# Patient Record
Sex: FEMALE | Race: WHITE | NOT HISPANIC OR LATINO | URBAN - METROPOLITAN AREA
[De-identification: names, ages, dates, MRNs, and addresses within clinical notes are randomized per-mention and may not be internally consistent; named-entity substitution may affect disease eponyms.]

---

## 2023-08-28 ENCOUNTER — APPOINTMENT (RX ONLY)
Dept: URBAN - METROPOLITAN AREA CLINIC 129 | Facility: CLINIC | Age: 33
Setting detail: DERMATOLOGY
End: 2023-08-28

## 2023-08-28 DIAGNOSIS — L30.9 DERMATITIS, UNSPECIFIED: ICD-10-CM

## 2023-08-28 DIAGNOSIS — L73.8 OTHER SPECIFIED FOLLICULAR DISORDERS: ICD-10-CM

## 2023-08-28 DIAGNOSIS — B36.0 PITYRIASIS VERSICOLOR: ICD-10-CM

## 2023-08-28 PROCEDURE — 99204 OFFICE O/P NEW MOD 45 MIN: CPT

## 2023-08-28 PROCEDURE — ? COUNSELING

## 2023-08-28 PROCEDURE — ? PRESCRIPTION

## 2023-08-28 PROCEDURE — ? TREATMENT REGIMEN

## 2023-08-28 RX ORDER — KETOCONAZOLE 20 MG/ML
SHAMPOO, SUSPENSION TOPICAL
Qty: 120 | Refills: 11 | Status: ERX | COMMUNITY
Start: 2023-08-28

## 2023-08-28 RX ORDER — TACROLIMUS 1 MG/G
OINTMENT TOPICAL
Qty: 60 | Refills: 2 | Status: ERX | COMMUNITY
Start: 2023-08-28

## 2023-08-28 RX ORDER — CLOBETASOL PROPIONATE 0.5 MG/G
OINTMENT TOPICAL
Qty: 30 | Refills: 1 | Status: ERX | COMMUNITY
Start: 2023-08-28

## 2023-08-28 RX ADMIN — TACROLIMUS: 1 OINTMENT TOPICAL at 00:00

## 2023-08-28 RX ADMIN — KETOCONAZOLE: 20 SHAMPOO, SUSPENSION TOPICAL at 00:00

## 2023-08-28 RX ADMIN — CLOBETASOL PROPIONATE: 0.5 OINTMENT TOPICAL at 00:00

## 2023-08-28 ASSESSMENT — LOCATION DETAILED DESCRIPTION DERM
LOCATION DETAILED: GENITALIA
LOCATION DETAILED: RIGHT LABIA MINORA
LOCATION DETAILED: LEFT LABIA MAJORA
LOCATION DETAILED: RIGHT LABIA MAJORA
LOCATION DETAILED: RIGHT MEDIAL BREAST 2-3:00 REGION
LOCATION DETAILED: LEFT LABIA MINORA
LOCATION DETAILED: LEFT MEDIAL BREAST 10-11:00 REGION

## 2023-08-28 ASSESSMENT — LOCATION SIMPLE DESCRIPTION DERM
LOCATION SIMPLE: LEFT BREAST
LOCATION SIMPLE: VULVA
LOCATION SIMPLE: RIGHT BREAST
LOCATION SIMPLE: GENITALIA

## 2023-08-28 ASSESSMENT — LOCATION ZONE DERM
LOCATION ZONE: TRUNK
LOCATION ZONE: VULVA

## 2023-08-28 NOTE — PROCEDURE: TREATMENT REGIMEN
Detail Level: Zone
Initiate Treatment: Clobetadol ointment BID x1 week, tacrolimus BID
Otc Regimen: Aquaphor daily

## 2023-09-26 ENCOUNTER — APPOINTMENT (RX ONLY)
Dept: URBAN - METROPOLITAN AREA CLINIC 129 | Facility: CLINIC | Age: 33
Setting detail: DERMATOLOGY
End: 2023-09-26

## 2023-09-26 DIAGNOSIS — Q826 OTHER SPECIFIED ANOMALIES OF SKIN: ICD-10-CM

## 2023-09-26 DIAGNOSIS — Q828 OTHER SPECIFIED ANOMALIES OF SKIN: ICD-10-CM

## 2023-09-26 DIAGNOSIS — L30.9 DERMATITIS, UNSPECIFIED: ICD-10-CM

## 2023-09-26 DIAGNOSIS — L259 CONTACT DERMATITIS AND OTHER ECZEMA, UNSPECIFIED CAUSE: ICD-10-CM | Status: INADEQUATELY CONTROLLED

## 2023-09-26 DIAGNOSIS — Q819 OTHER SPECIFIED ANOMALIES OF SKIN: ICD-10-CM

## 2023-09-26 DIAGNOSIS — L73.8 OTHER SPECIFIED FOLLICULAR DISORDERS: ICD-10-CM

## 2023-09-26 PROBLEM — L23.9 ALLERGIC CONTACT DERMATITIS, UNSPECIFIED CAUSE: Status: ACTIVE | Noted: 2023-09-26

## 2023-09-26 PROBLEM — L85.8 OTHER SPECIFIED EPIDERMAL THICKENING: Status: ACTIVE | Noted: 2023-09-26

## 2023-09-26 PROCEDURE — ? COUNSELING

## 2023-09-26 PROCEDURE — ? TREATMENT REGIMEN

## 2023-09-26 PROCEDURE — 99214 OFFICE O/P EST MOD 30 MIN: CPT

## 2023-09-26 PROCEDURE — ? ADDITIONAL NOTES

## 2023-09-26 ASSESSMENT — LOCATION SIMPLE DESCRIPTION DERM
LOCATION SIMPLE: RIGHT POSTERIOR UPPER ARM
LOCATION SIMPLE: VULVA
LOCATION SIMPLE: GENITALIA
LOCATION SIMPLE: LEFT POSTERIOR UPPER ARM

## 2023-09-26 ASSESSMENT — LOCATION ZONE DERM
LOCATION ZONE: TRUNK
LOCATION ZONE: VULVA
LOCATION ZONE: ARM

## 2023-09-26 ASSESSMENT — LOCATION DETAILED DESCRIPTION DERM
LOCATION DETAILED: RIGHT LABIA MAJORA
LOCATION DETAILED: LEFT PROXIMAL POSTERIOR UPPER ARM
LOCATION DETAILED: LEFT LABIA MAJORA
LOCATION DETAILED: RIGHT PROXIMAL POSTERIOR UPPER ARM
LOCATION DETAILED: RIGHT LABIA MINORA
LOCATION DETAILED: LEFT LABIA MINORA
LOCATION DETAILED: GENITALIA

## 2023-09-26 ASSESSMENT — ITCH NUMERIC RATING SCALE: HOW SEVERE IS YOUR ITCHING?: 5

## 2023-09-26 NOTE — PROCEDURE: TREATMENT REGIMEN
Detail Level: Zone
Otc Regimen: Aquaphor daily
Continue Regimen: tacrolimus 0.1 % topical ointment as needed

## 2023-11-06 ENCOUNTER — APPOINTMENT (RX ONLY)
Dept: URBAN - METROPOLITAN AREA CLINIC 129 | Facility: CLINIC | Age: 33
Setting detail: DERMATOLOGY
End: 2023-11-06

## 2023-11-06 DIAGNOSIS — L23.9 ALLERGIC CONTACT DERMATITIS, UNSPECIFIED CAUSE: ICD-10-CM

## 2023-11-06 PROCEDURE — ? PATCH TESTING

## 2023-11-06 PROCEDURE — 95044 PATCH/APPLICATION TESTS: CPT

## 2023-11-06 PROCEDURE — ? ADDITIONAL NOTES

## 2023-11-06 ASSESSMENT — LOCATION ZONE DERM: LOCATION ZONE: TRUNK

## 2023-11-06 ASSESSMENT — LOCATION DETAILED DESCRIPTION DERM: LOCATION DETAILED: INFERIOR THORACIC SPINE

## 2023-11-06 ASSESSMENT — LOCATION SIMPLE DESCRIPTION DERM: LOCATION SIMPLE: UPPER BACK

## 2023-11-08 ENCOUNTER — APPOINTMENT (RX ONLY)
Dept: URBAN - METROPOLITAN AREA CLINIC 129 | Facility: CLINIC | Age: 33
Setting detail: DERMATOLOGY
End: 2023-11-08

## 2023-11-08 DIAGNOSIS — L23.9 ALLERGIC CONTACT DERMATITIS, UNSPECIFIED CAUSE: ICD-10-CM | Status: INADEQUATELY CONTROLLED

## 2023-11-08 PROCEDURE — ? COUNSELING

## 2023-11-08 PROCEDURE — ? CORE ACDS PATCH TEST READING

## 2023-11-08 PROCEDURE — 99212 OFFICE O/P EST SF 10 MIN: CPT

## 2023-11-08 NOTE — PROCEDURE: CORE ACDS PATCH TEST READING
Name Of Allergen 52: Chlorhexidine digluconate 0.5% aq
Allergen 11 Reaction: no reaction
Name Of Allergen 29: Imidazolidinyl urea 2% pet
Name Of Allergen 2: Lanolin alcohol (Amerchol 101) 50% pet
Name Of Allergen 62: Sodium benzoate 5% pet
Name Of Allergen 39: Polymyxin B sulfate 3% pet
What Reading Time Point?: 48 hour
Name Of Allergen 7: Colophony 20% pet
Name Of Allergen 57: Sesquiterpene lactone mix 0.1% pet
Name Of Allergen 67: Sorbitan sesquioleate 20% pet
Name Of Allergen 17: Tetracaine hydrochloride 5% pet
Name Of Allergen 78: 2,6-Ditert-butyl-4-cresol (BHT) 2% pet
Name Of Allergen 12: Cobalt chloride 1% pet
Name Of Allergen 73: Amidoamine 0.1% aq
Detail Level: Zone
Name Of Allergen 54: Chloroxylenol (PCMX) 1% pet
Name Of Allergen 27: Tixocortol-21-pivalate
Name Of Allergen 44: Oleamidopropyl dimethylamine 0.1% aq
Name Of Allergen 4: Potassium dichromate 0.25% pet
Name Of Allergen 22: Mercapto mix 1% pet
Number Of Patches Read: 80
Name Of Allergen 49: D/L-?-Tocopherol 100%
Name Of Allergen 36: Lidocaine 15% pet
Name Of Allergen 64: Talia 2% pet
Name Of Allergen 31: Hydrocortisone-17-butyrate 1% pet
Name Of Allergen 41: Mixed dialkyl thioureas 1% pet
Name Of Allergen 79: 2-Ethylhexyl-4-methoxycinnamate 10% pet
Show Negative Results In The Note?: Yes
Name Of Allergen 50: Ethyl acrylate 0.1% pet
Name Of Allergen 45: Decyl glucoside 5% pet
Name Of Allergen 5: DMDM hydantoin 1% pet
Name Of Allergen 65: Compositae mix II 5% pet
Name Of Allergen 32: Mercaptobenzothiazole 1% pet
Show Allergen Counseling In The Note?: No
Name Of Allergen 37: Propylene glycol 100% aq
Name Of Allergen 55: 2-Hydroxy-4-methoxybenzophenone (benzophenone-3) 10% pet
Name Of Allergen 15: Carba mix 3% pet
Name Of Allergen 10: Balsam of [place] 25% pet
Name Of Allergen 60: Benzalkonium chloride 0.1% aq
Name Of Allergen 70: Ethylhexylglycerin 5% pet
Name Of Allergen 71: Triamcinolone 1% pet
Name Of Allergen 47: Lavender absolute 2% pet
Name Of Allergen 76: Disperse Orange 3 1% pet
Name Of Allergen 20: p-Phenylenediamine 1% pet
Name Of Allergen 34: Fragrance mix II 14% pet
Name Of Allergen 42: 3-(Dimethylamino)-propylamine 1% aq
Name Of Allergen 25: Diazolidinyl urea 1% pet
Name Of Allergen 16: Black rubber mix 0.6% pet
Name Of Allergen 43: Hydroxyethyl methacrylate 2% pet
Name Of Allergen 26: Benzocaine 5% pet
Name Of Allergen 30: Budesonide 0.1% pet
Name Of Allergen 77: Benzoic acid 5% pet
Name Of Allergen 48: Cinnamic aldehyde 1% pet
Name Of Allergen 35: Disperse blue 106/124 mix 1% pet
Name Of Allergen 53: Propolis 10% pet
Name Of Allergen 63: Sorbic acid 2% pet
Name Of Allergen 13: i-llxd-Jxeecmeqskq formaldehyde resin 1% pet
Name Of Allergen 3: Neomycin 20% pet
Name Of Allergen 8: Paraben mix 12% pet
Name Of Allergen 58: Cocamide KATHRYN 0.5% pet
Name Of Allergen 18: Quaternium 15 2% pet
Name Of Allergen 40: Cocamidopropyl betaine 1% aq
Name Of Allergen 23: 2-Bromo-2-nitropropane-1,3-diol 0.5% pet
Name Of Allergen 74: Ethyl cyanoacrylate 10% pet
Name Of Allergen 68: n,n-Diphenylguanidine 1% pet
Name Of Allergen 69: Cetyl steryl alcohol 20% pet
Name Of Allergen 59: 4-Chloro-3-cresol ([facility]) 1% pet
Name Of Allergen 24: Thiuram mix 1% pet
Name Of Allergen 14: Epoxy resin 1% pet
Name Of Allergen 9: Methylisothiazolinone 0.2% aq
Name Of Allergen 19: Methyldibromoglutaronitrile 0.5% pet
Name Of Allergen 75: Phenoxyethanol 1% pet
Name Of Allergen 46: Methyl methacrylate 2% pet
Name Of Allergen 80: Benzyl alcohol 10% soft
Name Of Allergen 6: Fragrance mix I 8% pet
Name Of Allergen 33: Bacitracin 20% pet
Name Of Allergen 51: Tea tree oil 5% pet
Name Of Allergen 1: Nickel sulfate 2.5% pet
Name Of Allergen 28: Gold sodium thiosulfate 2% pet
Name Of Allergen 61: 2-Hydroxy-4-methoxybenzophenone-5-sulfonic acid (benzophenone-4) 2% pet
Name Of Allergen 38: Iodopropynyl butylcarbamate 0.1% pet
Name Of Allergen 72: Clobetasol-17-propionate 1% pet
Name Of Allergen 66: Ethyleneurea melamine-formaldehyde 5% pet
Name Of Allergen 56: Tosylamide formaldehyde resin 10% pet
Name Of Allergen 11: Ethylenediamine dihydrochloride 1% pet
Name Of Allergen 21: Formaldehyde 2% aq

## 2023-11-10 ENCOUNTER — APPOINTMENT (RX ONLY)
Dept: URBAN - METROPOLITAN AREA CLINIC 129 | Facility: CLINIC | Age: 33
Setting detail: DERMATOLOGY
End: 2023-11-10

## 2023-11-10 DIAGNOSIS — L23.9 ALLERGIC CONTACT DERMATITIS, UNSPECIFIED CAUSE: ICD-10-CM

## 2023-11-10 PROCEDURE — 99214 OFFICE O/P EST MOD 30 MIN: CPT

## 2023-11-10 PROCEDURE — ? CORE ACDS PATCH TEST READING

## 2023-11-10 PROCEDURE — ? COUNSELING

## 2023-11-10 NOTE — PROCEDURE: CORE ACDS PATCH TEST READING
Allergen 49 Reaction: no reaction
Name Of Allergen 36: Lidocaine 15% pet
Name Of Allergen 12: Cobalt chloride 1% pet
Name Of Allergen 25: Diazolidinyl urea 1% pet
Name Of Allergen 64: Talia 2% pet
Name Of Allergen 41: Mixed dialkyl thioureas 1% pet
Name Of Allergen 4: Potassium dichromate 0.25% pet
Name Of Allergen 77: Benzoic acid 5% pet
Detail Level: Zone
Name Of Allergen 74: Ethyl cyanoacrylate 10% pet
Name Of Allergen 55: 2-Hydroxy-4-methoxybenzophenone (benzophenone-3) 10% pet
Name Of Allergen 69: Cetyl steryl alcohol 20% pet
Name Of Allergen 46: Methyl methacrylate 2% pet
Name Of Allergen 65: Compositae mix II 5% pet
Name Of Allergen 51: Tea tree oil 5% pet
Number Of Patches Read: 80
Name Of Allergen 17: Tetracaine hydrochloride 5% pet
Name Of Allergen 22: Mercapto mix 1% pet
Name Of Allergen 9: Methylisothiazolinone 0.2% aq
Name Of Allergen 61: 2-Hydroxy-4-methoxybenzophenone-5-sulfonic acid (benzophenone-4) 2% pet
Name Of Allergen 38: Iodopropynyl butylcarbamate 0.1% pet
Name Of Allergen 14: Epoxy resin 1% pet
Name Of Allergen 27: Tixocortol-21-pivalate
Name Of Allergen 70: Ethylhexylglycerin 5% pet
Name Of Allergen 52: Chlorhexidine digluconate 0.5% aq
Name Of Allergen 66: Ethyleneurea melamine-formaldehyde 5% pet
Name Of Allergen 10: Balsam of [place] 25% pet
Name Of Allergen 71: Triamcinolone 1% pet
Name Of Allergen 72: Clobetasol-17-propionate 1% pet
Name Of Allergen 28: Gold sodium thiosulfate 2% pet
Name Of Allergen 33: Bacitracin 20% pet
Name Of Allergen 62: Sodium benzoate 5% pet
Allergen 1 Reaction: 1+
Name Of Allergen 15: Carba mix 3% pet
Name Of Allergen 53: Propolis 10% pet
Name Of Allergen 75: Phenoxyethanol 1% pet
Name Of Allergen 44: Oleamidopropyl dimethylamine 0.1% aq
Name Of Allergen 80: Benzyl alcohol 10% soft
Name Of Allergen 39: Polymyxin B sulfate 3% pet
Name Of Allergen 58: Cocamide KATHRYN 0.5% pet
Name Of Allergen 20: p-Phenylenediamine 1% pet
Name Of Allergen 67: Sorbitan sesquioleate 20% pet
Name Of Allergen 49: D/L-?-Tocopherol 100%
Name Of Allergen 2: Lanolin alcohol (Amerchol 101) 50% pet
What Reading Time Point?: 48 hour
Name Of Allergen 30: Budesonide 0.1% pet
Name Of Allergen 7: Colophony 20% pet
Name Of Allergen 40: Cocamidopropyl betaine 1% aq
Name Of Allergen 45: Decyl glucoside 5% pet
Name Of Allergen 59: 4-Chloro-3-cresol ([facility]) 1% pet
Name Of Allergen 21: Formaldehyde 2% aq
Name Of Allergen 73: Amidoamine 0.1% aq
Name Of Allergen 3: Neomycin 20% pet
Name Of Allergen 68: n,n-Diphenylguanidine 1% pet
Name Of Allergen 13: x-qvdu-Gcnoejdjjrx formaldehyde resin 1% pet
Name Of Allergen 26: Benzocaine 5% pet
Name Of Allergen 8: Paraben mix 12% pet
Name Of Allergen 37: Propylene glycol 100% aq
Name Of Allergen 78: 2,6-Ditert-butyl-4-cresol (BHT) 2% pet
Name Of Allergen 47: Lavender absolute 2% pet
Name Of Allergen 18: Quaternium 15 2% pet
Name Of Allergen 31: Hydrocortisone-17-butyrate 1% pet
Name Of Allergen 60: Benzalkonium chloride 0.1% aq
Show Negative Results In The Note?: Yes
Name Of Allergen 5: DMDM hydantoin 1% pet
Name Of Allergen 56: Tosylamide formaldehyde resin 10% pet
Name Of Allergen 42: 3-(Dimethylamino)-propylamine 1% aq
Name Of Allergen 23: 2-Bromo-2-nitropropane-1,3-diol 0.5% pet
Show Allergen Counseling In The Note?: No
Name Of Allergen 32: Mercaptobenzothiazole 1% pet
Name Of Allergen 6: Fragrance mix I 8% pet
Name Of Allergen 24: Thiuram mix 1% pet
Name Of Allergen 1: Nickel sulfate 2.5% pet
Name Of Allergen 57: Sesquiterpene lactone mix 0.1% pet
Name Of Allergen 79: 2-Ethylhexyl-4-methoxycinnamate 10% pet
Name Of Allergen 43: Hydroxyethyl methacrylate 2% pet
Name Of Allergen 48: Cinnamic aldehyde 1% pet
Name Of Allergen 19: Methyldibromoglutaronitrile 0.5% pet
Name Of Allergen 35: Disperse blue 106/124 mix 1% pet
Name Of Allergen 34: Fragrance mix II 14% pet
Name Of Allergen 11: Ethylenediamine dihydrochloride 1% pet
Name Of Allergen 63: Sorbic acid 2% pet
Name Of Allergen 54: Chloroxylenol (PCMX) 1% pet
Name Of Allergen 29: Imidazolidinyl urea 2% pet
Name Of Allergen 50: Ethyl acrylate 0.1% pet
Name Of Allergen 76: Disperse Orange 3 1% pet
Name Of Allergen 16: Black rubber mix 0.6% pet